# Patient Record
Sex: MALE | Race: ASIAN | Employment: FULL TIME | URBAN - METROPOLITAN AREA
[De-identification: names, ages, dates, MRNs, and addresses within clinical notes are randomized per-mention and may not be internally consistent; named-entity substitution may affect disease eponyms.]

---

## 2022-09-08 ENCOUNTER — OFFICE VISIT (OUTPATIENT)
Dept: FAMILY MEDICINE CLINIC | Age: 27
End: 2022-09-08
Payer: COMMERCIAL

## 2022-09-08 ENCOUNTER — HOSPITAL ENCOUNTER (OUTPATIENT)
Dept: GENERAL RADIOLOGY | Age: 27
Discharge: HOME | End: 2022-09-08
Payer: COMMERCIAL

## 2022-09-08 VITALS
HEIGHT: 71 IN | SYSTOLIC BLOOD PRESSURE: 142 MMHG | HEART RATE: 72 BPM | WEIGHT: 255 LBS | OXYGEN SATURATION: 98 % | BODY MASS INDEX: 35.7 KG/M2 | DIASTOLIC BLOOD PRESSURE: 82 MMHG

## 2022-09-08 DIAGNOSIS — M79.602 LEFT ARM PAIN: ICD-10-CM

## 2022-09-08 DIAGNOSIS — R10.13 EPIGASTRIC PAIN: ICD-10-CM

## 2022-09-08 DIAGNOSIS — R10.13 EPIGASTRIC PAIN: Primary | ICD-10-CM

## 2022-09-08 DIAGNOSIS — N52.9 ERECTILE DYSFUNCTION, UNSPECIFIED ERECTILE DYSFUNCTION TYPE: ICD-10-CM

## 2022-09-08 LAB
A/G RATIO: 1.4 (ref 1.1–2.2)
ALBUMIN SERPL-MCNC: 4.4 G/DL (ref 3.4–5)
ALP BLD-CCNC: 92 U/L (ref 40–129)
ALT SERPL-CCNC: 52 U/L (ref 10–40)
ANION GAP SERPL CALCULATED.3IONS-SCNC: 10 MMOL/L (ref 3–16)
AST SERPL-CCNC: 29 U/L (ref 15–37)
BILIRUB SERPL-MCNC: 0.3 MG/DL (ref 0–1)
BUN BLDV-MCNC: 6 MG/DL (ref 7–20)
CALCIUM SERPL-MCNC: 9.6 MG/DL (ref 8.3–10.6)
CHLORIDE BLD-SCNC: 102 MMOL/L (ref 99–110)
CO2: 24 MMOL/L (ref 21–32)
CREAT SERPL-MCNC: 0.8 MG/DL (ref 0.9–1.3)
GFR AFRICAN AMERICAN: >60
GFR NON-AFRICAN AMERICAN: >60
GLUCOSE BLD-MCNC: 97 MG/DL (ref 70–99)
HCT VFR BLD CALC: 42.6 % (ref 40.5–52.5)
HEMOGLOBIN: 14.2 G/DL (ref 13.5–17.5)
LIPASE: 19 U/L (ref 13–60)
MCH RBC QN AUTO: 25.6 PG (ref 26–34)
MCHC RBC AUTO-ENTMCNC: 33.4 G/DL (ref 31–36)
MCV RBC AUTO: 76.6 FL (ref 80–100)
PDW BLD-RTO: 14.7 % (ref 12.4–15.4)
PLATELET # BLD: 307 K/UL (ref 135–450)
PMV BLD AUTO: 7 FL (ref 5–10.5)
POTASSIUM SERPL-SCNC: 4.3 MMOL/L (ref 3.5–5.1)
RBC # BLD: 5.56 M/UL (ref 4.2–5.9)
SODIUM BLD-SCNC: 136 MMOL/L (ref 136–145)
TOTAL PROTEIN: 7.6 G/DL (ref 6.4–8.2)
WBC # BLD: 6.1 K/UL (ref 4–11)

## 2022-09-08 PROCEDURE — 73060 X-RAY EXAM OF HUMERUS: CPT

## 2022-09-08 PROCEDURE — 99204 OFFICE O/P NEW MOD 45 MIN: CPT | Performed by: FAMILY MEDICINE

## 2022-09-08 RX ORDER — PANTOPRAZOLE SODIUM 40 MG/1
40 TABLET, DELAYED RELEASE ORAL
Qty: 30 TABLET | Refills: 2 | Status: SHIPPED | OUTPATIENT
Start: 2022-09-08 | End: 2022-09-13 | Stop reason: SDUPTHER

## 2022-09-08 RX ORDER — SILDENAFIL 100 MG/1
100 TABLET, FILM COATED ORAL PRN
Qty: 10 TABLET | Refills: 2 | Status: SHIPPED | OUTPATIENT
Start: 2022-09-08

## 2022-09-08 SDOH — ECONOMIC STABILITY: FOOD INSECURITY: WITHIN THE PAST 12 MONTHS, THE FOOD YOU BOUGHT JUST DIDN'T LAST AND YOU DIDN'T HAVE MONEY TO GET MORE.: NEVER TRUE

## 2022-09-08 SDOH — ECONOMIC STABILITY: FOOD INSECURITY: WITHIN THE PAST 12 MONTHS, YOU WORRIED THAT YOUR FOOD WOULD RUN OUT BEFORE YOU GOT MONEY TO BUY MORE.: NEVER TRUE

## 2022-09-08 ASSESSMENT — PATIENT HEALTH QUESTIONNAIRE - PHQ9
SUM OF ALL RESPONSES TO PHQ QUESTIONS 1-9: 0
SUM OF ALL RESPONSES TO PHQ9 QUESTIONS 1 & 2: 0
SUM OF ALL RESPONSES TO PHQ QUESTIONS 1-9: 0
SUM OF ALL RESPONSES TO PHQ QUESTIONS 1-9: 0
1. LITTLE INTEREST OR PLEASURE IN DOING THINGS: 0
2. FEELING DOWN, DEPRESSED OR HOPELESS: 0
SUM OF ALL RESPONSES TO PHQ QUESTIONS 1-9: 0

## 2022-09-08 ASSESSMENT — SOCIAL DETERMINANTS OF HEALTH (SDOH): HOW HARD IS IT FOR YOU TO PAY FOR THE VERY BASICS LIKE FOOD, HOUSING, MEDICAL CARE, AND HEATING?: NOT HARD AT ALL

## 2022-09-08 NOTE — PROGRESS NOTES
Deborah Moralezgaguillermo (:  1995) is a 32 y.o. male,New patient, here for evaluation of the following chief complaint(s):  New Patient (Patient needs new PCP, complains of L shoulder & elbow pain x few months, requesting physical.)         ASSESSMENT/PLAN:  Deborah Qureshi was seen today for new patient. Diagnoses and all orders for this visit:    Epigastric pain  -     H. Pylori Breath Test, Adult; Future  -     Comprehensive Metabolic Panel; Future  -     CBC; Future  -     Lipase; Future  blood work to eval. Checking h pylori before starting ppi  Left arm pa  -     XR HUMERUS LEFT (MIN 2 VIEWS); Future  Xray to evalu and HEP  Advised to call back directly if there are further questions, or if these symptoms fail to improve as anticipated or worsen. Ed  Trial of viagra  Medication side affects and adverse reactions reviewed. -     Discontinue: pantoprazole (PROTONIX) 40 MG tablet; Take 1 tablet by mouth every morning (before breakfast)  -     sildenafil (VIAGRA) 100 MG tablet; Take 1 tablet by mouth as needed for Erectile Dysfunction       No follow-ups on file. Subjective   SUBJECTIVE/OBJECTIVE:  HPI  Pt is a of 32 y.o. male comes in today with   Chief Complaint   Patient presents with    New Patient     Patient needs new PCP, complains of L shoulder & elbow pain x few months, requesting physical.     Notes anxiety and palpitations. Notes a lot of gerd. Left shoulder/elbow pain. Notes gerd; responds to antacid  Happens more if under stress. Reports tdap in 2015. Vitals:    22 1006   BP: (!) 142/82   Site: Right Upper Arm   Position: Sitting   Cuff Size: Large Adult   Pulse: 72   SpO2: 98%   Weight: 255 lb (115.7 kg)   Height: 5' 11\" (1.803 m)        Review of Systems   Constitutional: Negative. Respiratory: Negative. Cardiovascular: Negative. Objective   Physical Exam  Constitutional:       General: He is not in acute distress.      Appearance: Normal appearance. He is well-developed. He is not diaphoretic. HENT:      Head: Normocephalic and atraumatic. Mouth/Throat:      Mouth: Mucous membranes are moist.      Pharynx: Oropharynx is clear. Eyes:      General: No scleral icterus. Neck:      Thyroid: No thyromegaly. Trachea: No tracheal deviation. Cardiovascular:      Rate and Rhythm: Normal rate and regular rhythm. Heart sounds: Normal heart sounds. No murmur heard. Pulmonary:      Effort: Pulmonary effort is normal.      Breath sounds: Normal breath sounds. No wheezing or rales. Abdominal:      General: There is no distension. Tenderness: There is no abdominal tenderness. Musculoskeletal:      Cervical back: Normal range of motion and neck supple. Lymphadenopathy:      Head:      Right side of head: No submental or submandibular adenopathy. Left side of head: No submental or submandibular adenopathy. Cervical: No cervical adenopathy. Skin:     General: Skin is warm and dry. Neurological:      Mental Status: He is alert and oriented to person, place, and time. Cranial Nerves: No cranial nerve deficit. Psychiatric:         Behavior: Behavior normal.         Thought Content: Thought content normal.         Judgment: Judgment normal.            An electronic signature was used to authenticate this note.     --Erika Welsh MD

## 2022-09-10 LAB — H PYLORI BREATH TEST: POSITIVE

## 2022-09-13 DIAGNOSIS — B96.81 H PYLORI ULCER: Primary | ICD-10-CM

## 2022-09-13 DIAGNOSIS — K27.9 H PYLORI ULCER: Primary | ICD-10-CM

## 2022-09-13 RX ORDER — METRONIDAZOLE 250 MG/1
250 TABLET ORAL 4 TIMES DAILY
Qty: 56 TABLET | Refills: 0 | Status: SHIPPED | OUTPATIENT
Start: 2022-09-13 | End: 2022-09-27

## 2022-09-13 RX ORDER — TETRACYCLINE HYDROCHLORIDE 500 MG/1
500 CAPSULE ORAL 4 TIMES DAILY
Qty: 56 CAPSULE | Refills: 0 | Status: SHIPPED | OUTPATIENT
Start: 2022-09-13 | End: 2022-09-27

## 2022-09-13 RX ORDER — PANTOPRAZOLE SODIUM 40 MG/1
40 TABLET, DELAYED RELEASE ORAL 2 TIMES DAILY
Qty: 56 TABLET | Refills: 2 | Status: SHIPPED | OUTPATIENT
Start: 2022-09-13 | End: 2022-09-27

## 2022-09-14 ASSESSMENT — ENCOUNTER SYMPTOMS: RESPIRATORY NEGATIVE: 1

## 2022-11-08 ENCOUNTER — OFFICE VISIT (OUTPATIENT)
Dept: FAMILY MEDICINE CLINIC | Age: 27
End: 2022-11-08
Payer: COMMERCIAL

## 2022-11-08 VITALS
HEIGHT: 71 IN | SYSTOLIC BLOOD PRESSURE: 140 MMHG | WEIGHT: 251 LBS | HEART RATE: 69 BPM | OXYGEN SATURATION: 98 % | DIASTOLIC BLOOD PRESSURE: 78 MMHG | BODY MASS INDEX: 35.14 KG/M2

## 2022-11-08 DIAGNOSIS — K59.00 CONSTIPATION, UNSPECIFIED CONSTIPATION TYPE: Primary | ICD-10-CM

## 2022-11-08 DIAGNOSIS — K64.8 INTERNAL HEMORRHOID: ICD-10-CM

## 2022-11-08 PROCEDURE — 99213 OFFICE O/P EST LOW 20 MIN: CPT | Performed by: FAMILY MEDICINE

## 2022-11-08 NOTE — PROGRESS NOTES
Yennifer Pinzon (:  1995) is a 32 y.o. male,Established patient, here for evaluation of the following chief complaint(s):  Rectal Bleeding (Patient complains of hard stool for almost a month, past 10 days has been developed blood in his stool. Denies diarrhea. Feels weak after passing BM.)         ASSESSMENT/PLAN:  Yennifer Coleman was seen today for rectal bleeding. Diagnoses and all orders for this visit:    Constipation, unspecified constipation type  Discussed fiber regimen. Start miralax  Internal hemorrhoid   If bleeding continues when constipation resolves will get scope    No follow-ups on file. Subjective   SUBJECTIVE/OBJECTIVE:  HPI  Pt is a of 32 y.o. male comes in today with   Chief Complaint   Patient presents with    Rectal Bleeding     Patient complains of hard stool for almost a month, past 10 days has been developed blood in his stool. Denies diarrhea. Feels weak after passing BM. Blood with bowel movement. Stool has been very hard. Started 10 days ago with the blood. Not very much. Not mixed in. Took laxative a few times but didn't do much. Has been drinking a lot of water. A lot of pain when sitting. Epigastric pain resolved unless he eats a lot greasy food. No fam h/o colon cancer or ibd  Vitals:    22 0805   BP: (!) 140/78   Site: Left Upper Arm   Position: Sitting   Cuff Size: Large Adult   Pulse: 69   SpO2: 98%   Weight: 251 lb (113.9 kg)   Height: 5' 11\" (1.803 m)      Review of Systems       Objective   Physical Exam     NAD  No rectal masses. No fissure or tenderness  Int hemorroid    An electronic signature was used to authenticate this note.     --Magdalena Carreno MD

## 2022-11-08 NOTE — PATIENT INSTRUCTIONS
Metamucil or other fiber daily  Miralax 17 g (1 capful) daily. Increase to twice daily in a few days if not result.

## 2023-03-02 ENCOUNTER — TELEPHONE (OUTPATIENT)
Dept: FAMILY MEDICINE CLINIC | Age: 28
End: 2023-03-02

## 2023-03-02 ENCOUNTER — OFFICE VISIT (OUTPATIENT)
Dept: URGENT CARE | Age: 28
End: 2023-03-02

## 2023-03-02 VITALS
TEMPERATURE: 98.1 F | WEIGHT: 250 LBS | HEIGHT: 71 IN | HEART RATE: 79 BPM | DIASTOLIC BLOOD PRESSURE: 95 MMHG | OXYGEN SATURATION: 98 % | SYSTOLIC BLOOD PRESSURE: 146 MMHG | BODY MASS INDEX: 35 KG/M2

## 2023-03-02 DIAGNOSIS — G56.02 CARPAL TUNNEL SYNDROME OF LEFT WRIST: ICD-10-CM

## 2023-03-02 DIAGNOSIS — R03.0 ELEVATED BLOOD PRESSURE READING: ICD-10-CM

## 2023-03-02 DIAGNOSIS — K21.9 GASTROESOPHAGEAL REFLUX DISEASE WITHOUT ESOPHAGITIS: Primary | ICD-10-CM

## 2023-03-02 DIAGNOSIS — R07.9 CHEST PAIN, UNSPECIFIED TYPE: ICD-10-CM

## 2023-03-02 RX ORDER — OMEPRAZOLE 20 MG/1
20 CAPSULE, DELAYED RELEASE ORAL
Qty: 60 CAPSULE | Refills: 0 | Status: SHIPPED | OUTPATIENT
Start: 2023-03-02

## 2023-03-02 NOTE — TELEPHONE ENCOUNTER
NT Call transferred from [facility] ([facility]) due to red flag. Pt complaining of left arm and hand pain, along with heartburn. Checked with MA and Nurse who suggested pt be seen at the ER.   Pt agreed but may try going to an UC first.

## 2023-03-02 NOTE — PROGRESS NOTES
Ovi Pinzon (:  1995) is a 32 y.o. male,New patient, here for evaluation of the following chief complaint(s):  Chest Pain (Previously tested positive for H. Pylori, with primary care. Concern for more serious condition because of left arm pain.) and Blurred Vision (Concerned for blurred vision after being at computer for 12+ hours)      ASSESSMENT/PLAN:  1. Gastroesophageal reflux disease without esophagitis  Start Omeprazole twice daily  GERD diet discussed  Follow up with PCP if sxs do not improve  - omeprazole (PRILOSEC) 20 MG delayed release capsule; Take 1 capsule by mouth 2 times daily (before meals)  Dispense: 60 capsule; Refill: 0    2. Carpal tunnel syndrome of left wrist  Tylenol for pain  OTC wrist splint for pain    3. Elevated blood pressure reading  Reports has had HTN workup with PCP and was normal, no antihypertensives prescribed. Encouraged patient to monitor blood pressure and follow up with PCP. DASH Diet and Lifestyle changes discussed, verbalized understanding. 4. Chest pain, unspecified type  EKG NSR  Most likely due to GERD  Denies pain at visit, VSS    - EKG 12 lead     Return if symptoms worsen or fail to improve. SUBJECTIVE/OBJECTIVE:  Patient comes in today for intermittent pressure in chest for one month, nothing makes worse, belching makes better. Has hx of Hpylori infection has taken Protonix in the past, not currently taking anything. Patient also complains of intermittent left wrist pain for one month. Pain radiates to forearm and bicept, does have tingling, denies numbness. Denies any known injury has not taken anything for pain. Patient reports he has high blood pressure and anxiety when comes to Dr. Norvin Sacks, has never taken anything for HTN, has never taken blood pressure at home. Patient has primary care physician who did complete work up for HTN and was negative.        History provided by:  Patient   used: No    Chest Pain Pertinent negatives include no abdominal pain, fever, nausea, palpitations or vomiting. Vitals:    03/02/23 1747 03/02/23 1805   BP: (!) 146/93 (!) 146/95   Pulse: 79    Temp: 98.1 °F (36.7 °C)    SpO2: 98%    Weight: 250 lb (113.4 kg)    Height: 5' 11\" (1.803 m)        Review of Systems   Constitutional:  Negative for fever. Cardiovascular:  Positive for chest pain. Negative for palpitations and leg swelling. Gastrointestinal:  Negative for abdominal pain, nausea and vomiting. Belching and gas   Musculoskeletal:         Left wrist, arm pain     Physical Exam  Vitals reviewed. Constitutional:       Appearance: Normal appearance. HENT:      Head: Normocephalic and atraumatic. Nose: Nose normal.   Eyes:      Conjunctiva/sclera: Conjunctivae normal.      Pupils: Pupils are equal, round, and reactive to light. Cardiovascular:      Rate and Rhythm: Normal rate and regular rhythm. Pulses: Normal pulses. Heart sounds: Normal heart sounds. No murmur heard. No friction rub. No gallop. Pulmonary:      Effort: Pulmonary effort is normal.      Breath sounds: Normal breath sounds. Abdominal:      General: Abdomen is flat. Bowel sounds are normal.      Palpations: Abdomen is soft. Musculoskeletal:      Right wrist: Normal.      Left wrist: No swelling, deformity or tenderness. Normal range of motion. Normal pulse. Comments: Positive Phalen's and Tinels left wrist   Skin:     General: Skin is warm and dry. Capillary Refill: Capillary refill takes less than 2 seconds. Neurological:      Mental Status: He is alert and oriented to person, place, and time. An electronic signature was used to authenticate this note.     --Osiel Borjas, HIARL - CNP

## 2023-03-03 ASSESSMENT — ENCOUNTER SYMPTOMS
VOMITING: 0
NAUSEA: 0
ABDOMINAL PAIN: 0

## 2023-03-03 NOTE — PATIENT INSTRUCTIONS
Start Omeprazole twice daily for acid reflux  Diet changes as discussed at appointment  Tylenol as needed for left wrist pain  May get over the counter wrist splint  Follow up with PCP if sxs do not improve

## 2024-04-03 ENCOUNTER — OFFICE VISIT (OUTPATIENT)
Dept: URGENT CARE | Age: 29
End: 2024-04-03

## 2024-04-03 VITALS
OXYGEN SATURATION: 98 % | WEIGHT: 253 LBS | DIASTOLIC BLOOD PRESSURE: 103 MMHG | HEART RATE: 109 BPM | HEIGHT: 65 IN | SYSTOLIC BLOOD PRESSURE: 163 MMHG | TEMPERATURE: 98.6 F | BODY MASS INDEX: 42.15 KG/M2

## 2024-04-03 DIAGNOSIS — R03.0 ELEVATED BLOOD PRESSURE READING: ICD-10-CM

## 2024-04-03 DIAGNOSIS — K62.89 RECTAL PAIN: ICD-10-CM

## 2024-04-03 DIAGNOSIS — K64.9 BLEEDING HEMORRHOID: Primary | ICD-10-CM

## 2024-04-03 RX ORDER — MINERAL OIL, PETROLATUM, PHENYLEPHRINE HCL 2.5; 140; 749 MG/G; MG/G; MG/G
OINTMENT TOPICAL 2 TIMES DAILY PRN
Qty: 1 EACH | Refills: 0 | Status: SHIPPED | OUTPATIENT
Start: 2024-04-03

## 2024-04-03 RX ORDER — MAG HYDROX/ALUMINUM HYD/SIMETH 400-400-40
1 SUSPENSION, ORAL (FINAL DOSE FORM) ORAL ONCE
Qty: 1 SUPPOSITORY | Refills: 0 | Status: SHIPPED | OUTPATIENT
Start: 2024-04-03 | End: 2024-04-03

## 2024-04-03 NOTE — PROGRESS NOTES
Pt Name: Jesús Pinzon  MRN: [id number]  Birthdate 1995    Provider: HIRAL Enriquez - CNP  PCP: Myles Styles MD    CHIEF COMPLAINT       Chief Complaint   Patient presents with    Rectal Bleeding     1 month. Comes and goes. Pt states a little more today. Pt states pain after bowel movements for 5-6 hours but its fine when he's sitting but when he's moving it hurts.        HISTORY OF PRESENT ILLNESS:      History From: pt            Chief Complaint: above    Jesús Pinzon (: 1995) is a 28 y.o. male, New patient, here for evaluation of the following chief complaint(s):  Rectal Bleeding (1 month. Comes and goes. Pt states a little more today. Pt states pain after bowel movements for 5-6 hours but its fine when he's sitting but when he's moving it hurts. )  Also notes bleeding after BM.   Multiple  Bms QD. Has been constipated in past.   Tried dulcolax with some relief.       ASSESSMENT/PLAN:       ICD-10-CM    1. Bleeding hemorrhoid  K64.9 [facility]     Myles Peterson MD, [facility]      2. Elevated blood pressure reading  R03.0 Myles Peterson MD, [facility]      3. Rectal pain  K62.89           Patient presenting with symptoms consistent with hemorrhoid.  No evidence for rectal fissure, perianal abscess, pilonidal cyst.  Patients vital signs were unremarkable.  Provided prescription for topical analgesics as well as stool softeners.  Symptomatic care discussed. Advised to followup with primary physician if having continued symptoms.    Advised supportive therapies, including advancement of fluids as tolerated, high-fiber diet, Sitz baths, cold application to affected site for 15min, gentle wiping following BM, and refrain from heavy lifting.    PCP for Bp recheck. Tells me 120s systolic at home     Will provide colorectal surgery referral for same    Er for